# Patient Record
Sex: MALE | Race: WHITE
[De-identification: names, ages, dates, MRNs, and addresses within clinical notes are randomized per-mention and may not be internally consistent; named-entity substitution may affect disease eponyms.]

---

## 2020-12-06 ENCOUNTER — TRANSCRIPTION ENCOUNTER (OUTPATIENT)
Age: 60
End: 2020-12-06

## 2020-12-07 ENCOUNTER — APPOINTMENT (OUTPATIENT)
Dept: DISASTER EMERGENCY | Facility: CLINIC | Age: 60
End: 2020-12-07

## 2020-12-07 ENCOUNTER — OUTPATIENT (OUTPATIENT)
Dept: INPATIENT UNIT | Facility: HOSPITAL | Age: 60
LOS: 1 days | Discharge: HOME | End: 2020-12-07

## 2020-12-07 VITALS
SYSTOLIC BLOOD PRESSURE: 154 MMHG | DIASTOLIC BLOOD PRESSURE: 75 MMHG | RESPIRATION RATE: 16 BRPM | TEMPERATURE: 99 F | HEART RATE: 79 BPM | OXYGEN SATURATION: 94 %

## 2020-12-07 VITALS
HEART RATE: 72 BPM | TEMPERATURE: 99 F | DIASTOLIC BLOOD PRESSURE: 80 MMHG | OXYGEN SATURATION: 94 % | SYSTOLIC BLOOD PRESSURE: 136 MMHG

## 2020-12-07 DIAGNOSIS — U07.1 COVID-19: ICD-10-CM

## 2020-12-07 PROBLEM — Z00.00 ENCOUNTER FOR PREVENTIVE HEALTH EXAMINATION: Status: ACTIVE | Noted: 2020-12-07

## 2020-12-07 RX ORDER — BAMLANIVIMAB 35 MG/ML
700 INJECTION, SOLUTION INTRAVENOUS ONCE
Refills: 0 | Status: DISCONTINUED | OUTPATIENT
Start: 2020-12-07 | End: 2020-12-07

## 2020-12-07 NOTE — CHART NOTE - NSCHARTNOTEFT_GEN_A_CORE
CC: Monoclonal Antibody Infusion/COVID 19 Positive  60yMale hx of DM, high cholesterol, HTN, CAD, COVID+ on 12/4/20 here today for monoclonal antibody infusion. Patient c/o cough and fever since  12/3/20. No SOB.     exam/findings:  T(C): --98.8  HR: --72  BP: --136/80  RR: --18  SpO2: --94%      PE:   Appearance: NAD	  HEENT:   PERRL, EOMI  Lymphatic: No lymphadenopathy  Cardiovascular: Normal S1 S2  Respiratory: Lungs clear to auscultation	  Gastrointestinal:  Soft, Non-tender  Skin: warm and dry  Neurologic: Non-focal  Extremities: Normal range of motion,    ASSESSMENT:  Pt is a 61 yo M  Covid +  referred by Dr. Agrawal who presents to infusion center for Monoclonal antibody infusion (Bamlanivimab)  Symptoms/ Criteria:  fever, cough  Risk Profile includes:  HTN, DM, CAD, BMI 35.9    PLAN:  - infusion procedure explained to patient   -Consent for monoclonal antibody infusion obtained   - Risk & benefits discussed/all questions answered  -infuse  Bamlanivimab 700mg  IV over one hour   -observe patient for one hour post infusion    I have reviewed the Bamlanivimab Emergency Use Authorization (EUA) and I have provided the patient or patient's caregiver with the following information:      1. FDA has authorized emergency use Bamlanivimab, which is not an FDA-approved biological product.  2. The patient or patient's caregiver has the option to accept or refuse administration of Bamlanivimab.   3. The significant known and potential risks and benefits of Bamlanivimab and the extent to which such risks and benefits are unknown.  4. Information on available alternative treatments and risks and benefits of those alternatives. CC: Monoclonal Antibody Infusion/COVID 19 Positive  60yMale hx of DM, high cholesterol, HTN, CAD, COVID+ on 12/4/20 here today for monoclonal antibody infusion. Patient c/o cough and fever since  12/3/20. No SOB.     exam/findings:  T(C): --98.8  HR: --72  BP: --136/80  RR: --18  SpO2: --94%      PE:   Appearance: NAD	  HEENT:   PERRL, EOMI  Lymphatic: No lymphadenopathy  Cardiovascular: Normal S1 S2  Respiratory: Lungs clear to auscultation	  Gastrointestinal:  Soft, Non-tender  Skin: warm and dry  Neurologic: Non-focal  Extremities: Normal range of motion,    ASSESSMENT:  Pt is a 59 yo M  Covid +  referred by Dr. Agrawal who presents to infusion center for Monoclonal antibody infusion (Bamlanivimab)  Symptoms/ Criteria:  fever, cough  Risk Profile includes:  HTN, DM, CAD, BMI 35.9    PLAN:  - infusion procedure explained to patient   -Consent for monoclonal antibody infusion obtained   - Risk & benefits discussed/all questions answered  -infuse  Bamlanivimab 700mg  IV over one hour   -observe patient for one hour post infusion    I have reviewed the Bamlanivimab Emergency Use Authorization (EUA) and I have provided the patient or patient's caregiver with the following information:      1. FDA has authorized emergency use Bamlanivimab, which is not an FDA-approved biological product.  2. The patient or patient's caregiver has the option to accept or refuse administration of Bamlanivimab.   3. The significant known and potential risks and benefits of Bamlanivimab and the extent to which such risks and benefits are unknown.  4. Information on available alternative treatments and risks and benefits of those alternatives.    DISCHARGE:   Patient tolerated infusion well. denies complaints of chest pain, SOB, dizziness, palpitations  VSS, stable for discharge home  D/C instructions given/fact sheet included  patient to follow up with PMD as needed CC: Monoclonal Antibody Infusion/COVID 19 Positive  60yMale hx of DM, high cholesterol, HTN, CAD, COVID+ on 12/4/20 here today for monoclonal antibody infusion. Patient c/o cough and fever since  12/3/20. No SOB.     exam/findings:  T(C): --98.8  HR: --72  BP: --136/80  RR: --18  SpO2: --94%      PE:   Appearance: NAD	  HEENT:   PERRL, EOMI  Lymphatic: No lymphadenopathy  Cardiovascular: Normal S1 S2  Respiratory: Lungs clear to auscultation	  Gastrointestinal:  Soft, Non-tender  Skin: warm and dry  Neurologic: Non-focal  Extremities: Normal range of motion,    ASSESSMENT:  Pt is a 59 yo M  Covid +  referred by Dr. Agrawal who presents to infusion center for Monoclonal antibody infusion (Bamlanivimab)  Symptoms/ Criteria:  fever, cough  Risk Profile includes:  HTN, DM, CAD, BMI 35.9    PLAN:  - infusion procedure explained to patient   -Consent for monoclonal antibody infusion obtained   - Risk & benefits discussed/all questions answered  -infuse  Bamlanivimab 700mg  IV over one hour   -observe patient for one hour post infusion    I have reviewed the Bamlanivimab Emergency Use Authorization (EUA) and I have provided the patient or patient's caregiver with the following information:      1. FDA has authorized emergency use Bamlanivimab, which is not an FDA-approved biological product.  2. The patient or patient's caregiver has the option to accept or refuse administration of Bamlanivimab.   3. The significant known and potential risks and benefits of Bamlanivimab and the extent to which such risks and benefits are unknown.  4. Information on available alternative treatments and risks and benefits of those alternatives.    DISCHARGE: 18:40  Patient tolerated infusion well. denies complaints of chest pain, SOB, dizziness, palpitations  VSS, stable for discharge home  D/C instructions given/fact sheet included  patient to follow up with PMD as needed

## 2020-12-08 ENCOUNTER — TRANSCRIPTION ENCOUNTER (OUTPATIENT)
Age: 60
End: 2020-12-08